# Patient Record
Sex: MALE | Race: WHITE | NOT HISPANIC OR LATINO | Employment: UNEMPLOYED | URBAN - METROPOLITAN AREA
[De-identification: names, ages, dates, MRNs, and addresses within clinical notes are randomized per-mention and may not be internally consistent; named-entity substitution may affect disease eponyms.]

---
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
## 2025-01-04 ENCOUNTER — HEALTH MAINTENANCE LETTER (OUTPATIENT)
Age: 20
End: 2025-01-04

## 2025-07-28 ENCOUNTER — HOSPITAL ENCOUNTER (EMERGENCY)
Facility: CLINIC | Age: 20
Discharge: LEFT BEFORE DISCHARGE | End: 2025-07-28
Admitting: EMERGENCY MEDICINE

## 2025-07-28 VITALS
OXYGEN SATURATION: 99 % | WEIGHT: 185 LBS | HEIGHT: 72 IN | HEART RATE: 95 BPM | BODY MASS INDEX: 25.06 KG/M2 | RESPIRATION RATE: 16 BRPM | TEMPERATURE: 97.8 F | DIASTOLIC BLOOD PRESSURE: 88 MMHG | SYSTOLIC BLOOD PRESSURE: 130 MMHG

## 2025-07-28 LAB
FLUAV RNA SPEC QL NAA+PROBE: NEGATIVE
FLUBV RNA RESP QL NAA+PROBE: NEGATIVE
RSV RNA SPEC NAA+PROBE: NEGATIVE
SARS-COV-2 RNA RESP QL NAA+PROBE: NEGATIVE

## 2025-07-28 PROCEDURE — 93005 ELECTROCARDIOGRAM TRACING: CPT

## 2025-07-28 PROCEDURE — 99281 EMR DPT VST MAYX REQ PHY/QHP: CPT

## 2025-07-28 PROCEDURE — 87637 SARSCOV2&INF A&B&RSV AMP PRB: CPT | Performed by: EMERGENCY MEDICINE

## 2025-07-28 ASSESSMENT — COLUMBIA-SUICIDE SEVERITY RATING SCALE - C-SSRS
2. HAVE YOU ACTUALLY HAD ANY THOUGHTS OF KILLING YOURSELF IN THE PAST MONTH?: NO
1. IN THE PAST MONTH, HAVE YOU WISHED YOU WERE DEAD OR WISHED YOU COULD GO TO SLEEP AND NOT WAKE UP?: NO
6. HAVE YOU EVER DONE ANYTHING, STARTED TO DO ANYTHING, OR PREPARED TO DO ANYTHING TO END YOUR LIFE?: NO

## 2025-07-28 ASSESSMENT — ACTIVITIES OF DAILY LIVING (ADL): ADLS_ACUITY_SCORE: 41

## 2025-07-29 ENCOUNTER — APPOINTMENT (OUTPATIENT)
Dept: ADMINISTRATIVE | Facility: CLINIC | Age: 20
End: 2025-07-29

## 2025-07-29 ENCOUNTER — OFFICE VISIT (OUTPATIENT)
Dept: URGENT CARE | Facility: URGENT CARE | Age: 20
End: 2025-07-29

## 2025-07-29 ENCOUNTER — ANCILLARY PROCEDURE (OUTPATIENT)
Dept: GENERAL RADIOLOGY | Facility: CLINIC | Age: 20
End: 2025-07-29

## 2025-07-29 VITALS
RESPIRATION RATE: 18 BRPM | TEMPERATURE: 98.6 F | WEIGHT: 193 LBS | HEART RATE: 71 BPM | SYSTOLIC BLOOD PRESSURE: 112 MMHG | DIASTOLIC BLOOD PRESSURE: 66 MMHG | BODY MASS INDEX: 26.14 KG/M2 | HEIGHT: 72 IN | OXYGEN SATURATION: 96 %

## 2025-07-29 DIAGNOSIS — J06.9 UPPER RESPIRATORY TRACT INFECTION, UNSPECIFIED TYPE: Primary | ICD-10-CM

## 2025-07-29 DIAGNOSIS — J06.9 UPPER RESPIRATORY TRACT INFECTION, UNSPECIFIED TYPE: ICD-10-CM

## 2025-07-29 LAB
ATRIAL RATE - MUSE: 68 BPM
DIASTOLIC BLOOD PRESSURE - MUSE: NORMAL MMHG
INTERPRETATION ECG - MUSE: NORMAL
P AXIS - MUSE: 71 DEGREES
PR INTERVAL - MUSE: 142 MS
QRS DURATION - MUSE: 82 MS
QT - MUSE: 356 MS
QTC - MUSE: 378 MS
R AXIS - MUSE: 56 DEGREES
SYSTOLIC BLOOD PRESSURE - MUSE: NORMAL MMHG
T AXIS - MUSE: 46 DEGREES
VENTRICULAR RATE- MUSE: 68 BPM

## 2025-07-29 PROCEDURE — 99213 OFFICE O/P EST LOW 20 MIN: CPT

## 2025-07-29 PROCEDURE — 71046 X-RAY EXAM CHEST 2 VIEWS: CPT | Mod: TC | Performed by: RADIOLOGY

## 2025-07-29 NOTE — ED TRIAGE NOTES
Pt c/o of cold sx for past 1.5 weeks, sore throat initially that is gone. Pt reports he has had several bloody noses. Pt reports slight CP with movement.      Triage Assessment (Adult)       Row Name 07/28/25 9054          Triage Assessment    Airway WDL WDL        Respiratory WDL    Respiratory WDL WDL        Cardiac WDL    Cardiac WDL X;chest pain        Chest Pain Assessment    Chest Pain Location midsternal     Precipitating Factors activity        Peripheral/Neurovascular WDL    Peripheral Neurovascular WDL WDL        Cognitive/Neuro/Behavioral WDL    Cognitive/Neuro/Behavioral WDL WDL

## 2025-07-29 NOTE — LETTER
2025    Michael Lange   2005        To Whom it May Concern;    Michael Lange was evaluated for a healthcare visit on 2025. Please excuse him from work through 25.     Sincerely,        Jazlyn James MD

## 2025-07-29 NOTE — PROGRESS NOTES
Assessment & Plan     Upper respiratory tract infection, unspecified type  Healthy 19-year-old male presenting with 1 to 2 weeks of vague URI symptoms.  Afebrile.  Physical exam completely reassuring: Pulmonary exam clear, cardiac exam normal, no cervical lymphadenopathy, no pharyngeal erythema.  He was evaluated in the emergency department (though had to leave prior to full evaluation due to long wait time).  Flu COVID RSV was negative at that time.  EKG was reassuring.  Chest x-ray obtained today and negative for any acute infectious findings.  Shared these results with the patient.  Discussed recommendation for symptom management with rest, fluids, ibuprofen/Tylenol as needed.  Patient agreeable.  Requested work note which was provided.    Jazlyn James MD  [facility] BAIRON Rodriguez is a 19 year old male who presents to clinic today for the following health issues:  Chief Complaint   Patient presents with    Urgent Care    URI     Sick 1-2 weeks   Started with really sore throat, went away after 1 day (resolved)  Pt presents with cold sx.   Pt reports chest pain with deep breaths, SOB, cough, nasal congestion, sniffling   Nosebleeds couple times a day 3-4 days -  Hx of nose bleeds when sick   Seen at ED left due to long wait time  EKG and respiratory were done at ED   Took Black seed oil two capsule, zinc, and Dayquil 1.5 hour ago today    Patient Request for Note/Letter     Pt needs a note for work          7/29/2025     5:34 PM   Additional Questions   Roomed by AF   Accompanied by Yara - Girlfriend         7/29/2025   Forms   Any forms needing to be completed Yes     HPI  - 19 year old previously healthy male presenting with 1-2 weeks URI symptoms: first day he had severe throat pain, pain with swallowing. This then resolved, but had residual congestion and cold. Yesterday he started to have more chest pain, worse with deep breaths.   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         Objective    /66   Pulse 71   Temp 98.6  F (37  C) (Tympanic)   Resp 18   Ht 1.829 m (6')   Wt 87.5 kg (193 lb)   SpO2 96%   BMI 26.18 kg/m    Physical Exam   General: Sitting comfortably. No acute distress. Partner present.   HEENT: Conjunctivae are clear, nonicteric.    Neck: No masses appreciated. No lymphadenopathy.   Respiratory: No respiratory distress. Lung sounds are clear without rales, ronchi, or wheezes.   Cardiac: RRR. No murmurs appreciated. No friction rub appreciated.   Abdominal: Abdomen is soft and non-tender without distention.  Skin: Visible skin is warm without rashes.  Neurological: Motor function is grossly normal.  Psychiatric: Good insight.

## 2025-07-29 NOTE — PROGRESS NOTES
Urgent Care Clinic Visit    Chief Complaint   Patient presents with    Urgent Care    URI     Sick 1-2 weeks   Started with really sore throat, went away after 1 day (resolved)  Pt presents with cold sx.   Pt reports chest pain with deep breaths, SOB, cough, nasal congestion, sniffling   Nosebleeds couple times a day 3-4 days -  Hx of nose bleeds when sick   Seen at ED left due to long wait time  EKG and respiratory were done at ED   Took Black seed oil two capsule, zinc, and Dayquil 1.5 hour ago today    Patient Request for Note/Letter     Pt needs a note for work                7/29/2025     5:34 PM   Additional Questions   Roomed by AF   Accompanied by Yara - Girlfriend         7/29/2025   Forms   Any forms needing to be completed Yes